# Patient Record
Sex: FEMALE | Race: WHITE | Employment: FULL TIME | URBAN - METROPOLITAN AREA
[De-identification: names, ages, dates, MRNs, and addresses within clinical notes are randomized per-mention and may not be internally consistent; named-entity substitution may affect disease eponyms.]

---

## 2017-05-02 DIAGNOSIS — M51.36 DDD (DEGENERATIVE DISC DISEASE), LUMBAR: ICD-10-CM

## 2017-07-05 ENCOUNTER — OFFICE VISIT (OUTPATIENT)
Dept: ORTHOPEDIC SURGERY | Age: 57
End: 2017-07-05

## 2017-07-05 VITALS
DIASTOLIC BLOOD PRESSURE: 90 MMHG | HEART RATE: 91 BPM | WEIGHT: 154.98 LBS | SYSTOLIC BLOOD PRESSURE: 136 MMHG | HEIGHT: 63 IN | BODY MASS INDEX: 27.46 KG/M2

## 2017-07-05 DIAGNOSIS — M54.16 LUMBAR RADICULITIS: ICD-10-CM

## 2017-07-05 DIAGNOSIS — M51.36 DDD (DEGENERATIVE DISC DISEASE), LUMBAR: ICD-10-CM

## 2017-07-05 DIAGNOSIS — M51.26 HNP (HERNIATED NUCLEUS PULPOSUS), LUMBAR: Primary | ICD-10-CM

## 2017-07-05 PROCEDURE — 99213 OFFICE O/P EST LOW 20 MIN: CPT | Performed by: PHYSICAL MEDICINE & REHABILITATION

## 2017-07-05 RX ORDER — PREGABALIN 75 MG/1
CAPSULE ORAL
Qty: 90 CAPSULE | Refills: 4 | Status: SHIPPED | OUTPATIENT
Start: 2017-07-05 | End: 2018-06-18

## 2017-07-07 ENCOUNTER — TELEPHONE (OUTPATIENT)
Dept: ORTHOPEDIC SURGERY | Age: 57
End: 2017-07-07

## 2017-08-29 ENCOUNTER — HOSPITAL ENCOUNTER (OUTPATIENT)
Dept: MAMMOGRAPHY | Age: 57
Discharge: HOME | End: 2017-08-29
Attending: OBSTETRICS & GYNECOLOGY | Admitting: OBSTETRICS & GYNECOLOGY

## 2017-08-29 DIAGNOSIS — Z12.31 ENCOUNTER FOR SCREENING MAMMOGRAM FOR BREAST CANCER: ICD-10-CM

## 2017-12-20 ENCOUNTER — OFFICE VISIT (OUTPATIENT)
Dept: ORTHOPEDIC SURGERY | Age: 57
End: 2017-12-20

## 2017-12-20 VITALS
WEIGHT: 154.98 LBS | HEART RATE: 70 BPM | BODY MASS INDEX: 27.46 KG/M2 | SYSTOLIC BLOOD PRESSURE: 124 MMHG | HEIGHT: 63 IN | DIASTOLIC BLOOD PRESSURE: 76 MMHG

## 2017-12-20 DIAGNOSIS — M51.36 DDD (DEGENERATIVE DISC DISEASE), LUMBAR: ICD-10-CM

## 2017-12-20 DIAGNOSIS — M51.26 HNP (HERNIATED NUCLEUS PULPOSUS), LUMBAR: Primary | ICD-10-CM

## 2017-12-20 DIAGNOSIS — M54.16 LUMBAR RADICULITIS: ICD-10-CM

## 2017-12-20 PROCEDURE — 99213 OFFICE O/P EST LOW 20 MIN: CPT | Performed by: PHYSICAL MEDICINE & REHABILITATION

## 2017-12-20 RX ORDER — PREGABALIN 75 MG/1
75 CAPSULE ORAL 3 TIMES DAILY
Qty: 90 CAPSULE | Refills: 5 | Status: SHIPPED | OUTPATIENT
Start: 2017-12-20 | End: 2018-06-18 | Stop reason: SDUPTHER

## 2017-12-20 NOTE — PROGRESS NOTES
bilaterally reveals all areas to be without enlargement or induration  · Sensation: Sensation is intact without deficit  · Coordination/Balance: Good coordination   ·   LUMBAR/SACRAL EXAMINATION:  · Inspection: Local inspection shows no step-off or bruising. Lumbar alignment is normal.  Sagittal and Coronal balance is neutral.      · Palpation:   No evidence of tenderness at the midline. No tenderness bilaterally at the paraspinal or trochanters. There is no step-off or paraspinal spasm. · Range of Motion:  Extension limited by 70%, flexion limited by 30%  · Strength:   Strength testing is 5/5 in all muscle groups tested. · Special Tests:   Straight leg raise and crossed SLR negative. Leg length and pelvis level.  0 out of 5 Paul's signs. · Skin: There are no rashes, ulcerations or lesions. · Reflexes: Reflexes are symmetrically 2+ at the patellar and ankle tendons. Clonus absent bilaterally at the feet. · Gait & station: Normal gait  · Additional Examinations:   ·   · RIGHT LOWER EXTREMITY: Inspection/examination of the right lower extremity does not show any tenderness, deformity or injury. Range of motion is full. There is no gross instability. There are no rashes, ulcerations or lesions. Strength and tone are normal.  · LEFT LOWER EXTREMITY:  Inspection/examination of the left lower extremity does not show any tenderness, deformity or injury. Range of motion is full. There is no gross instability. There are no rashes, ulcerations or lesions. Strength and tone are normal.    Diagnostic Testin cervical MRI shows cord flattening left C4 5    Impression:    Chronic back pain L4 5 disc herniation and workers comp injury  Medication maintenance with lyrica    Plan:    Orders Placed This Encounter   Medications    pregabalin (LYRICA) 75 MG capsule     Sig: Take 1 capsule by mouth 3 times daily .      Dispense:  90 capsule     Refill:  5     6 month follow-up    The risks and use of maintenance opiate medication were reviewed. These include the risk of tolerance, addition, and abuse. Potential side effects were also discussed. Medications are to be taken as prescribed and not escalated without prior agreement. OACARLOSS/BONY reviewed & appropriate. Opiate medications will only be prescribed through the office of AR[facility] Ron unless notified otherwise.           Nathan Gallegos

## 2018-06-18 ENCOUNTER — OFFICE VISIT (OUTPATIENT)
Dept: ORTHOPEDIC SURGERY | Age: 58
End: 2018-06-18

## 2018-06-18 VITALS
HEIGHT: 63 IN | HEART RATE: 84 BPM | DIASTOLIC BLOOD PRESSURE: 87 MMHG | SYSTOLIC BLOOD PRESSURE: 143 MMHG | WEIGHT: 154.98 LBS | BODY MASS INDEX: 27.46 KG/M2

## 2018-06-18 DIAGNOSIS — M51.26 HNP (HERNIATED NUCLEUS PULPOSUS), LUMBAR: ICD-10-CM

## 2018-06-18 DIAGNOSIS — M51.36 DDD (DEGENERATIVE DISC DISEASE), LUMBAR: ICD-10-CM

## 2018-06-18 DIAGNOSIS — M54.16 LUMBAR RADICULITIS: ICD-10-CM

## 2018-06-18 PROCEDURE — 99214 OFFICE O/P EST MOD 30 MIN: CPT | Performed by: PHYSICAL MEDICINE & REHABILITATION

## 2018-06-18 RX ORDER — PREGABALIN 75 MG/1
75 CAPSULE ORAL 3 TIMES DAILY
Qty: 90 CAPSULE | Refills: 5 | Status: SHIPPED | OUTPATIENT
Start: 2018-06-18 | End: 2019-09-11 | Stop reason: ALTCHOICE

## 2018-07-05 ENCOUNTER — OFFICE VISIT (OUTPATIENT)
Dept: ORTHOPEDIC SURGERY | Age: 58
End: 2018-07-05

## 2018-07-05 DIAGNOSIS — M79.605 PAIN IN BOTH LOWER EXTREMITIES: Primary | ICD-10-CM

## 2018-07-05 DIAGNOSIS — M79.604 PAIN IN BOTH LOWER EXTREMITIES: Primary | ICD-10-CM

## 2018-07-05 PROCEDURE — 95886 MUSC TEST DONE W/N TEST COMP: CPT | Performed by: PHYSICAL MEDICINE & REHABILITATION

## 2018-07-05 PROCEDURE — 95909 NRV CNDJ TST 5-6 STUDIES: CPT | Performed by: PHYSICAL MEDICINE & REHABILITATION

## 2018-07-05 NOTE — PROGRESS NOTES
[address] MEDICINE      Patient: Bakari Morales  Sex: Female  YOB: 1960  Age: 62 Years 9 Months  Date: 7/5/18  Ref. Phys.: Natali Dawson MD  Notes:  bilateral foot pain      Sensory NCS      Nerve / Sites Peak PeakAmp Dist Van    ms µV cm m/s   L SURAL   1. Calf 3.95 18.2 14 45.2   R SURAL   1. Calf 4.05 7.5 14 43.8       Motor NCS      Nerve / Sites Lat Amp Amp Dist Van    ms mV % cm m/s   L COMM PERONEAL - EDB   1. Ankle 5.35 2.6 100     2. FibHead 12.65 2.7 102 30 41.1   3. Knee 15.30 2.5 93.5 12 45.3   R COMM PERONEAL - EDB   1. Ankle 4.60 2.6 100 8    2. FibHead 10.70 2.8 108 29 47.5   3. Knee 12.60 2.8 107 10 52.6   L TIBIAL (KNEE) - AH   1. Ankle 4.20 4.3 100 8    R TIBIAL (KNEE) - AH   1. Ankle 4.15 1.7 100         F  Wave      Nerve Fmin    ms   L TIBIAL (KNEE) 50.25   R TIBIAL (KNEE) 49.70       EMG Summary Table     Spontaneous MUAP Recruit. Ins. Act Fibs. PSW Fasics. H.F. Amp. Dur. Poly's. Pattern   R. TIB ANTERIOR N None None None None N N N N   R. GASTROCN (MED) N None None None None N N N N   R. VAST MEDIALIS N None None None None N N N N   R. PERON LONGUS N None None None None N N N N   R. LUMB PSP (L) N None None None None N N N N   R. GLUTEUS MAX N None None None None N N N N   R. GLUTEUS MED N None None None None N N N N   L. GLUTEUS MAX N None None None None N N N N   L. GLUTEUS MED N None None None None N N N N   L. LUMB PSP (L) N None None None None N N N N   L. PERON LONGUS N None None None None N N N N   L. GASTROCN (MED) N None None None None N N N N   L. VAST MEDIALIS N None None None None N N N N   L. TIB ANTERIOR N None None None None N N N N       Summary: Nerve conduction studies and monopolar exam of bilateral lower extremities are normal.      Impression: Normal examination.     1.  No evidence of a left or right lower extremity mononeuropathy, plexopathy, or radiculopathy          Ivin Grounds Matteo Carroll MD

## 2018-07-10 ENCOUNTER — OFFICE VISIT (OUTPATIENT)
Dept: ORTHOPEDIC SURGERY | Age: 58
End: 2018-07-10

## 2018-07-10 VITALS
HEART RATE: 87 BPM | DIASTOLIC BLOOD PRESSURE: 78 MMHG | HEIGHT: 63 IN | SYSTOLIC BLOOD PRESSURE: 124 MMHG | BODY MASS INDEX: 30.12 KG/M2 | WEIGHT: 170 LBS

## 2018-07-10 DIAGNOSIS — M77.8 CAPSULITIS OF FOOT, RIGHT: Primary | ICD-10-CM

## 2018-07-10 PROCEDURE — L3260 AMBULATORY SURGICAL BOOT EAC: HCPCS | Performed by: PODIATRIST

## 2018-07-10 PROCEDURE — 99203 OFFICE O/P NEW LOW 30 MIN: CPT | Performed by: PODIATRIST

## 2018-07-10 NOTE — PROGRESS NOTES
HISTORY OF PRESENT ILLNESS: This is an initial visit for a 80-year-old female with a chief complaint of right forefoot pain. She's been having pain for over a year. No history of  trauma is related. The pain is primarily on the bottom of the forefoot. The pain is worsened with weightbearing and relieved with rest.    FAMILY HISTORY:  Documented in chart. SOCIAL HISTORY:  Documented in chart. REVIEW OF SYSTEMS: Unremarkable for psychiatric, dermatologic, neurologic,  cardiovascular, hematologic, gastrointestinal, genitourinary, and pulmonary  problems. PHYSICAL EXAM: The area of greatest palpable tenderness is at the plantar aspect  of the right 2nd MTP. There is mild edema in the area without erythema or ecchymosis. There is mild pain with range of motion to the joint, especially at end range of dorsiflexion. There is no deviation of the digit in the transverse plane with range of motion, nor is there a static deformity. Palpable pedal pulses are present. The sensation is grossly intact. RADIOGRAPHS: None taken      ASSESSMENT: 2nd MTP capsulitis and metatarsalgia, right foot      PLAN: The patient was educated on the pathology and its treatment options. I agree with Dr. Chelsea Black in that I do not feel that this is caused by any lower back issues. I think she has simply not stopped to treat this and the continued activity just drives the symptoms. A temporary arch support and a post-op shoe was applied to the right foot. Weightbearing will be as tolerated. All activities are to be performed with this combination on the foot. Overall activity is to be decreased. The patient is to rest, ice, and elevate to relieve pain and/or swelling. The patient is to return in 3 weeks for reevaluation. Procedures    Darco Post-op Shoe Brace     Patient was prescribed a Darco Post-Op Shoe.   The right foot will require stabilization / immobilization from this semi-rigid / rigid orthosis to improve their function. The orthosis will assist in protecting the affected area, provide functional support and facilitate healing. Patient was instructed to progress ambulation weight bearing as tolerated in the device. The patient was educated and fit by a healthcare professional with expert knowledge and specialization in brace application while under the direct supervision of the treating physician. Verbal and written instructions for the use of and application of this item were provided. They were instructed to contact the office immediately should the brace result in increased pain, decreased sensation, increased swelling or worsening of the condition.

## 2018-08-14 ENCOUNTER — OFFICE VISIT (OUTPATIENT)
Dept: ORTHOPEDIC SURGERY | Age: 58
End: 2018-08-14

## 2018-08-14 VITALS
DIASTOLIC BLOOD PRESSURE: 85 MMHG | WEIGHT: 164 LBS | BODY MASS INDEX: 29.06 KG/M2 | SYSTOLIC BLOOD PRESSURE: 138 MMHG | HEIGHT: 63 IN | HEART RATE: 75 BPM

## 2018-08-14 DIAGNOSIS — M77.8 CAPSULITIS OF FOOT, RIGHT: Primary | ICD-10-CM

## 2018-08-14 PROCEDURE — 99213 OFFICE O/P EST LOW 20 MIN: CPT | Performed by: PODIATRIST

## 2018-09-15 ENCOUNTER — HOSPITAL ENCOUNTER (OUTPATIENT)
Dept: WOMENS IMAGING | Age: 58
Discharge: HOME | End: 2018-09-15
Payer: COMMERCIAL

## 2018-09-15 DIAGNOSIS — Z12.31 ENCOUNTER FOR SCREENING MAMMOGRAM FOR BREAST CANCER: ICD-10-CM

## 2018-09-15 PROCEDURE — 77063 BREAST TOMOSYNTHESIS BI: CPT

## 2018-12-20 ENCOUNTER — OFFICE VISIT (OUTPATIENT)
Dept: ORTHOPEDIC SURGERY | Age: 58
End: 2018-12-20
Payer: COMMERCIAL

## 2018-12-20 VITALS
DIASTOLIC BLOOD PRESSURE: 99 MMHG | WEIGHT: 164.02 LBS | HEIGHT: 63 IN | HEART RATE: 81 BPM | SYSTOLIC BLOOD PRESSURE: 134 MMHG | BODY MASS INDEX: 29.06 KG/M2

## 2018-12-20 DIAGNOSIS — M51.26 HNP (HERNIATED NUCLEUS PULPOSUS), LUMBAR: Primary | ICD-10-CM

## 2018-12-20 PROCEDURE — 99213 OFFICE O/P EST LOW 20 MIN: CPT | Performed by: PHYSICAL MEDICINE & REHABILITATION

## 2018-12-20 RX ORDER — PREGABALIN 75 MG/1
75 CAPSULE ORAL 3 TIMES DAILY
Qty: 90 CAPSULE | Refills: 5 | Status: SHIPPED | OUTPATIENT
Start: 2018-12-20 | End: 2019-05-20 | Stop reason: SDUPTHER

## 2019-05-22 ENCOUNTER — OFFICE VISIT (OUTPATIENT)
Dept: ORTHOPEDIC SURGERY | Age: 59
End: 2019-05-22
Payer: COMMERCIAL

## 2019-05-22 VITALS
DIASTOLIC BLOOD PRESSURE: 93 MMHG | BODY MASS INDEX: 29.06 KG/M2 | HEIGHT: 63 IN | SYSTOLIC BLOOD PRESSURE: 141 MMHG | HEART RATE: 67 BPM | WEIGHT: 164.02 LBS

## 2019-05-22 DIAGNOSIS — M54.16 LUMBAR RADICULITIS: Primary | ICD-10-CM

## 2019-05-22 DIAGNOSIS — M51.36 DDD (DEGENERATIVE DISC DISEASE), LUMBAR: ICD-10-CM

## 2019-05-22 DIAGNOSIS — M51.26 HNP (HERNIATED NUCLEUS PULPOSUS), LUMBAR: ICD-10-CM

## 2019-05-22 PROCEDURE — 99213 OFFICE O/P EST LOW 20 MIN: CPT | Performed by: PHYSICAL MEDICINE & REHABILITATION

## 2019-05-22 RX ORDER — PREGABALIN 75 MG/1
75 CAPSULE ORAL 3 TIMES DAILY PRN
Qty: 90 CAPSULE | Refills: 2 | Status: SHIPPED | OUTPATIENT
Start: 2019-06-17 | End: 2019-09-04 | Stop reason: SDUPTHER

## 2019-05-22 NOTE — PROGRESS NOTES
deficit  · Coordination/Balance: Good coordination   ·   LUMBAR/SACRAL EXAMINATION:  · Inspection: Local inspection shows no step-off or bruising. Lumbar alignment is normal.  Sagittal and Coronal balance is neutral.      · Palpation:   No evidence of tenderness at the midline. No tenderness bilaterally at the paraspinal or trochanters. There is no step-off or paraspinal spasm. · Range of Motion:  She has near full flexion 50% loss of extension Strength:   Strength testing is 5/5 in all muscle groups tested. · Special Tests:   Straight leg raise and crossed SLR negative. Leg length and pelvis level.  0 out of 5 Paul's signs. · Skin: There are no rashes, ulcerations or lesions. · Reflexes: Reflexes are symmetrically 2+ at the patellar and trace ankle tendons. Clonus absent bilaterally at the feet. · Gait & station: Normal gait  · Additional Examinations:   ·   · RIGHT LOWER EXTREMITY: Inspection/examination of the right lower extremity does not show any tenderness, deformity or injury. Range of motion is full. There is no gross instability. There are no rashes, ulcerations or lesions. Strength and tone are normal.  · LEFT LOWER EXTREMITY:  Inspection/examination of the left lower extremity does not show any tenderness, deformity or injury. Range of motion is full. There is no gross instability. There are no rashes, ulcerations or lesions. Strength and tone are normal.    Diagnostic Testin cervical MRI shows cord flattening left C4 5    Impression:    Chronic back pain L4 5 disc herniation and workers comp injury  Medication maintenance with lyrica    Plan:    Lyrica 75 t.i.d. with 2 refills    OARRS reviewed today    3month follow-up    The risks and use of maintenance opiate medication were reviewed. These include the risk of tolerance, addition, and abuse. Potential side effects were also discussed.  Medications are to be taken as prescribed and not escalated without prior agreement. OARRS/BONY reviewed & appropriate. Opiate medications will only be prescribed through the office of Dr Daved Ganser, M. D. unless notified otherwise.           Daved Ganser

## 2019-09-04 ENCOUNTER — OFFICE VISIT (OUTPATIENT)
Dept: ORTHOPEDIC SURGERY | Age: 59
End: 2019-09-04
Payer: COMMERCIAL

## 2019-09-04 VITALS
SYSTOLIC BLOOD PRESSURE: 127 MMHG | BODY MASS INDEX: 29.06 KG/M2 | WEIGHT: 164.02 LBS | HEART RATE: 74 BPM | DIASTOLIC BLOOD PRESSURE: 87 MMHG | HEIGHT: 63 IN

## 2019-09-04 DIAGNOSIS — M54.16 LUMBAR RADICULITIS: Primary | ICD-10-CM

## 2019-09-04 DIAGNOSIS — M51.36 DDD (DEGENERATIVE DISC DISEASE), LUMBAR: ICD-10-CM

## 2019-09-04 DIAGNOSIS — M51.26 HNP (HERNIATED NUCLEUS PULPOSUS), LUMBAR: ICD-10-CM

## 2019-09-04 PROCEDURE — 99213 OFFICE O/P EST LOW 20 MIN: CPT | Performed by: PHYSICAL MEDICINE & REHABILITATION

## 2019-09-04 RX ORDER — PREGABALIN 75 MG/1
75 CAPSULE ORAL 3 TIMES DAILY PRN
Qty: 90 CAPSULE | Refills: 2 | Status: SHIPPED | OUTPATIENT
Start: 2019-09-04 | End: 2019-12-04 | Stop reason: SDUPTHER

## 2019-09-11 ENCOUNTER — OFFICE VISIT (OUTPATIENT)
Dept: ORTHOPEDIC SURGERY | Age: 59
End: 2019-09-11
Payer: COMMERCIAL

## 2019-09-11 VITALS
BODY MASS INDEX: 29.06 KG/M2 | DIASTOLIC BLOOD PRESSURE: 77 MMHG | SYSTOLIC BLOOD PRESSURE: 132 MMHG | HEART RATE: 69 BPM | HEIGHT: 63 IN | WEIGHT: 164.02 LBS

## 2019-09-11 DIAGNOSIS — M70.62 GREATER TROCHANTERIC BURSITIS OF LEFT HIP: ICD-10-CM

## 2019-09-11 DIAGNOSIS — M25.552 LEFT HIP PAIN: Primary | ICD-10-CM

## 2019-09-11 DIAGNOSIS — R29.898 WEAKNESS OF LEFT HIP: ICD-10-CM

## 2019-09-11 DIAGNOSIS — M76.32 ILIOTIBIAL BAND SYNDROME OF LEFT SIDE: ICD-10-CM

## 2019-09-11 PROCEDURE — 99243 OFF/OP CNSLTJ NEW/EST LOW 30: CPT | Performed by: FAMILY MEDICINE

## 2019-09-11 RX ORDER — MELOXICAM 15 MG/1
15 TABLET ORAL DAILY
Qty: 30 TABLET | Refills: 3 | Status: SHIPPED | OUTPATIENT
Start: 2019-09-11

## 2019-09-11 NOTE — PROGRESS NOTES
intact. Gait: Reasonably fluid gait. Reflex symmetrically preserved      Additional Comments:             Additional Examinations:     Contralateral Exam: Examination of the right hip reveals intact skin. The patient demonstrates full painless range of motion with regards to flexion, abduction, internal and external rotation. There is not tenderness about the greater trochanter. There is a negative straight leg raise against resistance. Strength is 5/5 thorough out all planes. Right Lower Extremity: Examination of the right lower extremity does not show any tenderness, deformity or injury. Range of motion is unremarkable. There is no gross instability. There are no rashes, ulcerations or lesions. Strength and tone are normal.  Left Lower Extremity: Examination of the left lower extremity does not show any tenderness, deformity or injury. Range of motion is unremarkable. There is no gross instability. There are no rashes, ulcerations or lesions. Strength and tone are normal.        Diagnostic Test Findings: Xr Hip Left (2-3 Views)    Result Date: 9/11/2019  Radiology exam is complete. No Radiologist dictation. Please follow up with ordering provider. Left hip AP pelvis and frog films were obtained today and does not show any evidence of high-grade degenerative changes involving the patient's left hip. No evidence of bursal calcification. Assessment & Plan:    Encounter Diagnoses   Name Primary?     Left hip pain Yes    Greater trochanteric bursitis of left hip     Weakness of left hip     Iliotibial band syndrome of left side        Orders Placed This Encounter   Procedures    XR HIP LEFT (2-3 VIEWS)     Standing Status:   Future     Number of Occurrences:   1     Standing Expiration Date:   9/11/2020    Ambulatory referral to Physical Therapy     Referral Priority:   Routine     Referral Type:   Eval and Treat     Referral Reason:   Specialty Services Required     Requested

## 2019-12-04 ENCOUNTER — OFFICE VISIT (OUTPATIENT)
Dept: ORTHOPEDIC SURGERY | Age: 59
End: 2019-12-04
Payer: COMMERCIAL

## 2019-12-04 VITALS — HEIGHT: 63 IN | BODY MASS INDEX: 29.06 KG/M2 | WEIGHT: 164.02 LBS

## 2019-12-04 DIAGNOSIS — M54.16 LUMBAR RADICULITIS: ICD-10-CM

## 2019-12-04 DIAGNOSIS — M51.26 HNP (HERNIATED NUCLEUS PULPOSUS), LUMBAR: ICD-10-CM

## 2019-12-04 DIAGNOSIS — M51.36 DDD (DEGENERATIVE DISC DISEASE), LUMBAR: ICD-10-CM

## 2019-12-04 PROCEDURE — 99213 OFFICE O/P EST LOW 20 MIN: CPT | Performed by: PHYSICAL MEDICINE & REHABILITATION

## 2019-12-04 RX ORDER — PREGABALIN 75 MG/1
75 CAPSULE ORAL 3 TIMES DAILY PRN
Qty: 90 CAPSULE | Refills: 2 | Status: SHIPPED | OUTPATIENT
Start: 2019-12-04 | End: 2020-03-11 | Stop reason: SDUPTHER

## 2020-03-11 ENCOUNTER — OFFICE VISIT (OUTPATIENT)
Dept: ORTHOPEDIC SURGERY | Age: 60
End: 2020-03-11
Payer: COMMERCIAL

## 2020-03-11 VITALS — BODY MASS INDEX: 29.06 KG/M2 | WEIGHT: 164.02 LBS | HEIGHT: 63 IN

## 2020-03-11 PROCEDURE — 99213 OFFICE O/P EST LOW 20 MIN: CPT | Performed by: PHYSICAL MEDICINE & REHABILITATION

## 2020-03-11 RX ORDER — PREGABALIN 75 MG/1
75 CAPSULE ORAL 3 TIMES DAILY PRN
Qty: 90 CAPSULE | Refills: 2 | Status: SHIPPED | OUTPATIENT
Start: 2020-03-11 | End: 2020-06-01 | Stop reason: SDUPTHER

## 2020-03-11 NOTE — PROGRESS NOTES
bilaterally reveals all areas to be without enlargement or induration  · Sensation: Sensation is intact without deficit  · Coordination/Balance: Good coordination   ·   LUMBAR/SACRAL EXAMINATION:  · Inspection: Local inspection shows no step-off or bruising. Lumbar alignment is normal.  Sagittal and Coronal balance is neutral.      · Palpation:   No evidence of tenderness at the midline. No tenderness bilaterally at the paraspinal or trochanters. There is no step-off or paraspinal spasm. · Range of Motion:  She has near full flexion 50% loss of extension Strength:   Strength testing is 5/5 in all muscle groups tested. · Special Tests:   Straight leg raise and crossed SLR negative. Leg length and pelvis level.  0 out of 5 Paul's signs. · Skin: There are no rashes, ulcerations or lesions. · Reflexes: Reflexes are symmetrically 2+ at the patellar and trace ankle tendons. Clonus absent bilaterally at the feet. · Gait & station: Normal gait  · Additional Examinations:   · RIGHT LOWER EXTREMITY: Inspection/examination of the right lower extremity does not show any tenderness, deformity or injury. Range of motion is full. There is no gross instability. There are no rashes, ulcerations or lesions. Strength and tone are normal.  · LEFT LOWER EXTREMITY:  Inspection/examination of the left lower extremity does not show any tenderness, deformity or injury. Range of motion is full. There is no gross instability. There are no rashes, ulcerations or lesions. Strength and tone are normal.    Diagnostic Testing:     No new studies today    Impression:    Chronic back pain L4 5 disc herniation and workers comp injury  Medication maintenance with lyrica  New left hip pain    Plan:      Lyrica 75 t.i.d. with 2 refills      OARRS reviewed today    3month follow-up    The risks and use of maintenance opiate medication were reviewed. These include the risk of tolerance, addition, and abuse.   Potential side effects were also discussed. Medications are to be taken as prescribed and not escalated without prior agreement. OARRS/BONY reviewed & appropriate. Opiate medications will only be prescribed through the office of ARSH Abdi unless notified otherwise.           Dedrick Wood

## 2020-05-27 ENCOUNTER — HOSPITAL ENCOUNTER (OUTPATIENT)
Dept: WOMENS IMAGING | Age: 60
Discharge: HOME | End: 2020-05-27
Payer: COMMERCIAL

## 2020-05-27 PROCEDURE — 77063 BREAST TOMOSYNTHESIS BI: CPT

## 2020-06-01 ENCOUNTER — OFFICE VISIT (OUTPATIENT)
Dept: ORTHOPEDIC SURGERY | Age: 60
End: 2020-06-01
Payer: COMMERCIAL

## 2020-06-01 VITALS — HEIGHT: 63 IN | WEIGHT: 164.02 LBS | BODY MASS INDEX: 29.06 KG/M2

## 2020-06-01 PROCEDURE — 99213 OFFICE O/P EST LOW 20 MIN: CPT | Performed by: PHYSICAL MEDICINE & REHABILITATION

## 2020-06-01 RX ORDER — PREGABALIN 75 MG/1
75 CAPSULE ORAL 3 TIMES DAILY PRN
Qty: 90 CAPSULE | Refills: 2 | Status: SHIPPED | OUTPATIENT
Start: 2020-06-01 | End: 2020-08-30

## 2020-06-01 NOTE — PROGRESS NOTES
Follow up: [facility]    CHIEF COMPLAINT:    Chief Complaint   Patient presents with    Back Pain       HISTORY OF PRESENT ILLNESS:                The patient is a 62 y.o. female history workers comp injury chronic back pain and right leg pain maintained on lyrica 75 mg t.i.d. No side effects. He is here for 3-month follow up      She reports she is tolerating the medicine without side effects. It does help her back pain. She is active and traveling and going to [place] this summer. She is sleeping up to 6 hours a night no fevers chills night sweats        Pain Assessment  Location of Pain: Back  Severity of Pain: 2  Quality of Pain: Aching  Duration of Pain: Persistent  Frequency of Pain: Constant  Aggravating Factors: Standing, Walking, Other (Comment)  Limiting Behavior: Yes  Relieving Factors: Rest  Result of Injury: No  Work-Related Injury: No  Are there other pain locations you wish to document?: No]      Past/Current Treatment     PT: Last therapy 7/17  Chiro:  Injections:   Medications: NSAIDs, lyrica 75 mg t.i.d.  Surgery:     Past Medical History: Medical history form was reviewed and scanned into the Media tab  History reviewed. No pertinent past medical history. REVIEW OF SYSTEMS:   CONSTITUTIONAL: Denies unexplained weight loss, fevers, chills or fatigue  NEUROLOGIC: Denies tremors or seizures         PHYSICAL EXAM:    Vitals: Blood pressure (!) 134/99, pulse 81, height 5' 2.99\" (1.6 m), weight 164 lb 0.4 oz (74.4 kg), not currently breastfeeding. GENERAL EXAM:  · General Apparence: Patient is adequately groomed with no evidence of malnutrition. · Orientation: The patient is oriented to time, place and person. · Mood & Affect:The patient's mood and affect are appropriate   · Vascular: Examination reveals no swelling tenderness in upper or lower extremities.    · Lymphatic: The lymphatic examination bilaterally reveals all areas to be without enlargement or induration  · Sensation: Sensation is intact without deficit  · Coordination/Balance: Good coordination   ·   LUMBAR/SACRAL EXAMINATION:  · Inspection: Local inspection shows no step-off or bruising. Lumbar alignment is normal.  Sagittal and Coronal balance is neutral.      · Palpation:   No evidence of tenderness at the midline. No tenderness bilaterally at the paraspinal or trochanters. There is no step-off or paraspinal spasm. · Range of Motion:  She has near full flexion 50% loss of extension Strength:   Strength testing is 5/5 in all muscle groups tested. · Special Tests:   Straight leg raise and crossed SLR negative. Leg length and pelvis level.  0 out of 5 Paul's signs. · Skin: There are no rashes, ulcerations or lesions. · Reflexes: Reflexes are symmetrically 2+ at the patellar and trace ankle tendons. Clonus absent bilaterally at the feet. · Gait & station: Normal gait  · Additional Examinations:   · RIGHT LOWER EXTREMITY: Inspection/examination of the right lower extremity does not show any tenderness, deformity or injury. Range of motion is full. There is no gross instability. There are no rashes, ulcerations or lesions. Strength and tone are normal.  · LEFT LOWER EXTREMITY:  Inspection/examination of the left lower extremity does not show any tenderness, deformity or injury. Range of motion is full. There is no gross instability. There are no rashes, ulcerations or lesions. Strength and tone are normal.    Diagnostic Testing:     No new studies today    Impression:    Chronic back pain L4 5 disc herniation and workers comp injury  Medication maintenance with lyrica  New left hip pain    Plan:      Lyrica 75 t.i.d. with 2 refills      OARRS reviewed today    3month follow-up. We will arrange this is a telemedicine visit    The risks and use of maintenance opiate medication were reviewed. These include the risk of tolerance, addition, and abuse. Potential side effects were also discussed.  Medications are to be taken as prescribed and not escalated without prior agreement. OARRS/BONY reviewed & appropriate. Opiate medications will only be prescribed through the office of ARSH Gerard unless notified otherwise.           Sukhwinder Lezama

## 2020-09-28 ENCOUNTER — VIRTUAL VISIT (OUTPATIENT)
Dept: ORTHOPEDIC SURGERY | Age: 60
End: 2020-09-28
Payer: COMMERCIAL

## 2020-09-28 PROCEDURE — 99213 OFFICE O/P EST LOW 20 MIN: CPT | Performed by: PHYSICIAN ASSISTANT

## 2020-09-28 RX ORDER — PREGABALIN 75 MG/1
75 CAPSULE ORAL 3 TIMES DAILY PRN
Qty: 90 CAPSULE | Refills: 2 | Status: SHIPPED | OUTPATIENT
Start: 2020-09-29 | End: 2020-10-29

## 2020-09-28 NOTE — PROGRESS NOTES
Virtual Visit: PM&R SPINE    CHIEF COMPLAINT:    Chief Complaint   Patient presents with    Back Pain       The patient is being evaluated by a Virtual Visit (video visit) encounter due to the restrictions of the COVID-19 pandemic. A caregiver was present when appropriate. All issues as below were discussed and addressed, but no physical exam was performed unless allowed by visual confirmation. If it was felt that patient should be evaluated in clinic then they were directed there. Due to this being a TeleHealth encounter (During ZFLBK-07 public health emergency), evaluation of the following organ systems was limited to: spine. Pursuant to the emergency declaration under the [address] [place] [address] waiver authority and the [employer] Act, this Virtual Visit was conducted with patient's verbal consent, to reduce the risk of exposure to COVID-19 and provide necessary medical care. The patient (and/or legal guardian) has also been advised to contact this office for worsening conditions or problems, and seek emergency medical treatment and/or call 911 if deemed necessary. Services were provided through a video synchronous discussion virtually to substitute for in-person encounter. Individuals present during telemedicine consultation-JS Milan    HISTORY OF PRESENT ILLNESS:                The patient is a 61 y.o. female virtual visit follow-up for medication maintenance history of chronic aching right low back and radiating leg pain (posterolateral to the foot) and remote work injury. Pain is increased with any prolonged activity or housework. Relief with resting. She reports significant improvement with Lyrica 75mg TID PRN without side effects. Medication does allow her to be more functional she is still working full-time as a . She is able to sleep 6 hours per night.   She currently denies any progressive numbness tingling or weakness. Pain is overall chronic and stable. Past/Current Treatment      PT: Last therapy   Chiro:  Injections:   Medications: NSAIDs, lyrica 75 mg t.i.d.  Surgery:     Function-Does the pain medication improve your ability to do:   · Personal care: Yes  · Housework: Yes   · Physical activity: Yes  · Social activity: Yes  · Work: Yes  · Improve quality of family life: Yes    Pain Scale: 1-10  With Meds:   210  Pain Scale: 1-10 Without Meds:.10/10    Potential aberrant drug-related behavior:  · Aberrant behavior identified? NO  · Potential aberrant behavior identified? NO  · Reports loss are stolen prescriptions? NO  · Insist on certain medications by name? NO  · Purposeful oversedation? NO  · Increased dose without authorization? NO    Objective Goals: Physically and mentally function, carry on ADLs and achieve quality of life     Rationale for medication choice and dosage: To improve physical functionality, perform ADLS and achieve quality of life. Pain f/u information:    · Pain contract/ORT: ORT equals 2020    Past Medical History: Medical history form was reviewed today & scanned into the media tab  Past Medical History:   Diagnosis Date    Greater trochanteric bursitis of left hip 2019      Past Surgical History:     Past Surgical History:   Procedure Laterality Date     SECTION      x3    CYST REMOVAL      ENDOMETRIAL ABLATION      FACIAL COSMETIC SURGERY      PARTIAL HYSTERECTOMY      PLANTAR FASCIA SURGERY       Current Medications:     Current Outpatient Medications:     meloxicam (MOBIC) 15 MG tablet, Take 1 tablet by mouth daily, Disp: 30 tablet, Rfl: 3    Multiple Vitamins-Minerals (EMERGEN-C IMMUNE PLUS PO), Take by mouth, Disp: , Rfl:     BIOTIN PO, Take  by mouth., Disp: , Rfl:     pregabalin (LYRICA) 75 MG capsule, Take 1 capsule by mouth 3 times daily as needed (nerve pain) for up to 90 days. , Disp: 90 capsule, Rfl: 2  Allergies:  Patient has no known allergies. Social History:    reports that she has quit smoking. She has never used smokeless tobacco. She reports that she does not drink alcohol or use drugs. Family History:   History reviewed. No pertinent family history. REVIEW OF SYSTEMS:   Patient's review of symptoms was reviewed and is significant for back pain and negative for recent unexplained weight loss, fatigue, current fever/chills, bowel or bladder dysfunction, chest pain, or shortness of breath. All other pertinent ROS were negative. PHYSICAL EXAM--limited to visual exam only  Vitals: not currently breastfeeding. height 5' 2.99\" (1.6 m), weight 164 lb 0.4 oz (74.4 kg)     GENERAL EXAM:  · General Apparence: Patient is adequately groomed with no evidence of malnutrition. · Orientation: The patient is oriented to time, place and person. · Mood & Affect:The patient's mood and affect are appropriate   · Coordination/Balance: Good coordination   LUMBAR/SACRAL EXAMINATION:  · Inspection: Lumbar alignment is normal.  Sagittal and Coronal balance is neutral.       · Range of Motion: Full flexion, mild loss of extension  · Gait & station: Normal unassisted  · Strength: In muscle groups visualized is at least anti gravity. Can heel/toe walk appropriately   · Sensation: intact to light touch of extremities per patient   · Additional Examinations:   · RIGHT LOWER EXTREMITY: Inspection/examination of the right lower extremity does not show any deformity or injury. Range of motion seen is full. There is no gross instability. · LEFT LOWER EXTREMITY:  Inspection/examination of the left lower extremity does not show any deformity or injury. Range of motion seen is full. There is no gross instability.      Diagnostic Testin cervical MRI shows cord flattening left C4 5        Impression:  1) Chronic low back pain, L4-5 HNP  2) Work injury   3) Medication maintenance ORT=1        Plan:   1) Lyrica 75mg I po TID PRN #90 2 refills  2) ORT today=1  3) F/u 3mo, sooner if needed       Time spent during this visit - 15min    The risks and use of maintenance opiate medication were reviewed. These include the risk of tolerance, addition, and abuse. Potential side effects were also discussed. Informed verbal consent was obtained. Medications are to be taken as prescribed and not escalated without prior agreement. OARRS/BONY reviewed & appropriate. Opiate medications will only be prescribed through the office of ARSH Maria unless notified otherwise         Goals of the current treatment regimen include: improvement in pain, improvement in overall in physical performance, ability to perform daily activities, work or disability, emotional distress, health care utilization and decreased medication consumption. Progress will be monitored towards achieving/maintaining therapeutic goals:    1. Improving perceived interference of pain with ADL's, ability to perform HEP, continue to improve in overall flexibility, ROM, strength and endurance, ability to do household activities indoor and/or outdoor, work and social/leisure activities. Improve psychosocial and physical functioning. 2. Improving sleep to 6-7 hours a night. Improve mood/ anxiety and depression symptoms    3. Reduction of reliance on opioid analgesia/more appropriate opioid use. Utilization of adjuvant medications as appropriate. Risks and benefits of the medications and alternative treatments have been discussed with the patient. The patient was advised against drinking alcohol with the opioid pain medicines, advised against driving or handling machinery when starting or adjusting the dose of medicines, feeling groggy or drowsy, or if having any cognitive issues related to the current medications. The patient is fully aware of the risk of overdose and death, if medicines are misused and not taken as prescribed.   Discussed patient's responsibility to safely store and appropriately dispose up medication. Prescription for Narcan offered. If the patient develops new symptoms or if the symptoms worsen, the patient was told to call the office.         [facility]

## 2020-12-30 ENCOUNTER — TELEPHONE (OUTPATIENT)
Dept: ORTHOPEDIC SURGERY | Age: 60
End: 2020-12-30

## 2021-01-05 ENCOUNTER — VIRTUAL VISIT (OUTPATIENT)
Dept: ORTHOPEDIC SURGERY | Age: 61
End: 2021-01-05
Payer: COMMERCIAL

## 2021-01-05 DIAGNOSIS — M54.16 LUMBAR RADICULITIS: Primary | ICD-10-CM

## 2021-01-05 DIAGNOSIS — M51.36 DDD (DEGENERATIVE DISC DISEASE), LUMBAR: ICD-10-CM

## 2021-01-05 DIAGNOSIS — G89.4 CHRONIC PAIN SYNDROME: ICD-10-CM

## 2021-01-05 PROCEDURE — 99212 OFFICE O/P EST SF 10 MIN: CPT | Performed by: PHYSICIAN ASSISTANT

## 2021-01-05 RX ORDER — PREGABALIN 75 MG/1
75 CAPSULE ORAL 3 TIMES DAILY PRN
Qty: 90 CAPSULE | Refills: 1 | Status: SHIPPED | OUTPATIENT
Start: 2021-01-05 | End: 2021-02-04

## 2021-01-05 NOTE — PROGRESS NOTES
The patient is a 61 y.o. female virtual visit follow-up for medication maintenance history of chronic aching right low back and radiating leg pain (posterolateral to the foot). History of remote work injury. Her pain is increased with any prolonged activity or housework--such as vacuuming. Relief with resting. She reports significant improvement with Lyrica 75mg TID PRN without side effects. Medication does allow her to be more functional she is still working full-time as a . She is able to sleep 6 hours per night. She currently denies any progressive numbness tingling or weakness. Pain is overall chronic and stable. Past/Current Treatment      PT: Last therapy 7/17  Chiro:  Injections:   Medications: NSAIDs, lyrica 75 mg t.i.d.  Surgery:     Function-Does the pain medication improve your ability to do:   · Personal care: Yes  · Housework: Yes   · Physical activity: Yes  · Social activity: Yes  · Work: Yes  · Improve quality of family life: Yes    Pain Scale: 1-10  With Meds:   2/10  Pain Scale: 1-10 Without Meds:.10/10    Potential aberrant drug-related behavior:  · Aberrant behavior identified? NO  · Potential aberrant behavior identified? NO  · Reports loss are stolen prescriptions? NO  · Insist on certain medications by name? NO  · Purposeful oversedation? NO  · Increased dose without authorization? NO    Objective Goals: Physically and mentally function, carry on ADLs and achieve quality of life     Rationale for medication choice and dosage: To improve physical functionality, perform ADLS and achieve quality of life.     Pain f/u information:    · Pain contract/ORT: ORT equals 1 September 2020    Past Medical History: Medical history form was reviewed today & scanned into the media tab  Past Medical History:   Diagnosis Date    Greater trochanteric bursitis of left hip 9/11/2019      Past Surgical History:     Past Surgical History:   Procedure Laterality Date   SECTION      x3    CYST REMOVAL      ENDOMETRIAL ABLATION      FACIAL COSMETIC SURGERY      PARTIAL HYSTERECTOMY      PLANTAR FASCIA SURGERY       Current Medications:     Current Outpatient Medications:     meloxicam (MOBIC) 15 MG tablet, Take 1 tablet by mouth daily, Disp: 30 tablet, Rfl: 3    Multiple Vitamins-Minerals (EMERGEN-C IMMUNE PLUS PO), Take by mouth, Disp: , Rfl:     BIOTIN PO, Take  by mouth., Disp: , Rfl:     pregabalin (LYRICA) 75 MG capsule, Take 1 capsule by mouth 3 times daily as needed (prn) for up to 30 days. , Disp: 90 capsule, Rfl: 2    pregabalin (LYRICA) 75 MG capsule, Take 1 capsule by mouth 3 times daily as needed (nerve pain) for up to 90 days. , Disp: 90 capsule, Rfl: 2  Allergies:  Patient has no known allergies. Social History:    reports that she has quit smoking. She has never used smokeless tobacco. She reports that she does not drink alcohol or use drugs. Family History:   History reviewed. No pertinent family history. REVIEW OF SYSTEMS:   Patient's review of symptoms was reviewed and is significant for back pain and negative for recent unexplained weight loss, fatigue, current fever/chills, bowel or bladder dysfunction, chest pain, or shortness of breath. All other pertinent ROS were negative. PHYSICAL EXAM--limited to visual exam only  Vitals: not currently breastfeeding. height 5' 2.99\" (1.6 m), weight 164 lb 0.4 oz (74.4 kg)     GENERAL EXAM:  · General Apparence: Patient is adequately groomed with no evidence of malnutrition. · Orientation: The patient is oriented to time, place and person.    · Mood & Affect:The patient's mood and affect are appropriate   · Coordination/Balance: Good coordination   LUMBAR/SACRAL EXAMINATION:  · Inspection: Lumbar alignment is normal.  Sagittal and Coronal balance is neutral.       · Range of Motion: Full flexion, mild loss of extension  · Gait & station: Normal unassisted · Strength: In muscle groups visualized is at least anti gravity. Can heel/toe walk appropriately   · Sensation: intact to light touch of extremities per patient   · Additional Examinations:   · RIGHT LOWER EXTREMITY: Inspection/examination of the right lower extremity does not show any deformity or injury. Range of motion seen is full. There is no gross instability. · LEFT LOWER EXTREMITY:  Inspection/examination of the left lower extremity does not show any deformity or injury. Range of motion seen is full. There is no gross instability. Diagnostic Testin cervical MRI shows cord flattening left C4 5        Impression:  1) Chronic low back pain, L4-5 HNP  2) Work injury   3) Medication maintenance ORT=1        Plan:   1) Lyrica 75mg I po TID PRN #90 1 refills; she still has 1 refill to fill this will provide 3-month total  2) F/u 3mo, sooner if needed       Time spent during this visit - 15min    The risks and use of maintenance opiate medication were reviewed. These include the risk of tolerance, addition, and abuse. Potential side effects were also discussed. Informed verbal consent was obtained. Medications are to be taken as prescribed and not escalated without prior agreement. OACARLOSS/BONY reviewed & appropriate. Opiate medications will only be prescribed through the office of ARSH Francis unless notified otherwise         Goals of the current treatment regimen include: improvement in pain, improvement in overall in physical performance, ability to perform daily activities, work or disability, emotional distress, health care utilization and decreased medication consumption.      Progress will be monitored towards achieving/maintaining therapeutic goals: 1. Improving perceived interference of pain with ADL's, ability to perform HEP, continue to improve in overall flexibility, ROM, strength and endurance, ability to do household activities indoor and/or outdoor, work and social/leisure activities. Improve psychosocial and physical functioning. 2. Improving sleep to 6-7 hours a night. Improve mood/ anxiety and depression symptoms    3. Reduction of reliance on opioid analgesia/more appropriate opioid use. Utilization of adjuvant medications as appropriate. Risks and benefits of the medications and alternative treatments have been discussed with the patient. The patient was advised against drinking alcohol with the opioid pain medicines, advised against driving or handling machinery when starting or adjusting the dose of medicines, feeling groggy or drowsy, or if having any cognitive issues related to the current medications. The patient is fully aware of the risk of overdose and death, if medicines are misused and not taken as prescribed. Discussed patient's responsibility to safely store and appropriately dispose up medication. Prescription for Narcan offered. If the patient develops new symptoms or if the symptoms worsen, the patient was told to call the office.         [facility]

## 2021-06-28 ENCOUNTER — HOSPITAL ENCOUNTER (OUTPATIENT)
Dept: WOMENS IMAGING | Age: 61
Discharge: HOME | End: 2021-06-28
Payer: COMMERCIAL

## 2021-06-28 DIAGNOSIS — Z12.31 VISIT FOR SCREENING MAMMOGRAM: ICD-10-CM

## 2021-06-28 PROCEDURE — 77063 BREAST TOMOSYNTHESIS BI: CPT
